# Patient Record
Sex: FEMALE | Race: BLACK OR AFRICAN AMERICAN | URBAN - METROPOLITAN AREA
[De-identification: names, ages, dates, MRNs, and addresses within clinical notes are randomized per-mention and may not be internally consistent; named-entity substitution may affect disease eponyms.]

---

## 2017-06-12 ENCOUNTER — HOSPITAL ENCOUNTER (EMERGENCY)
Facility: CLINIC | Age: 22
Discharge: HOME | End: 2017-06-12
Attending: FAMILY MEDICINE | Admitting: FAMILY MEDICINE

## 2017-06-12 ENCOUNTER — HOSPITAL ENCOUNTER (OUTPATIENT)
Dept: GENERAL RADIOLOGY | Facility: CLINIC | Age: 22
Discharge: HOME | End: 2017-06-12
Attending: FAMILY MEDICINE | Admitting: FAMILY MEDICINE

## 2017-06-12 DIAGNOSIS — S89.92XA LEFT KNEE INJURY, INITIAL ENCOUNTER: ICD-10-CM
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
## 2017-06-12 DIAGNOSIS — S89.92XA LOWER EXTREMITY INJURY, LEFT, INITIAL ENCOUNTER: ICD-10-CM

## 2017-06-12 PROCEDURE — 99283 EMERGENCY DEPT VISIT LOW MDM: CPT

## 2017-06-12 PROCEDURE — 99282 EMERGENCY DEPT VISIT SF MDM: CPT

## 2017-06-12 PROCEDURE — 99284 EMERGENCY DEPT VISIT MOD MDM: CPT | Mod: Z6 | Performed by: FAMILY MEDICINE

## 2017-06-12 NOTE — ED PROVIDER NOTES
History   No chief complaint on file.    HPI  Brenda Mcclain is a 21 year old female who presents with left knee injury. Patient was playing football yesterday when she got tackled to the ground. It felt as if her left leg hyperextended with this. She got up and suddenly her left knee buckled and she fell. She has had left knee pain and swelling with this. She has had difficulty ambulating and bearing weight due to the pain. She denies hip pain or ankle pain. No previous history of knee problems. She denies history of coagulopathy and is not on any blood thinners. She declines any pain medications.     No past medical history on file.    No past surgical history on file.    No family history on file.    Social History   Substance Use Topics     Smoking status: Not on file     Smokeless tobacco: Not on file     Alcohol use Not on file       No current facility-administered medications for this encounter.      No current outpatient prescriptions on file.      Allergies not on file  I have reviewed the Medications, Allergies, Past Medical and Surgical History, and Social History in the Epic system.    Review of Systems   Constitutional: Positive for activity change (difficulty weightbearing because of left knee pain). Negative for appetite change and fever.   HENT: Negative for congestion.    Eyes: Negative for redness.   Respiratory: Negative for shortness of breath.    Cardiovascular: Negative for chest pain.   Gastrointestinal: Negative for abdominal pain, nausea and vomiting.   Genitourinary: Negative for difficulty urinating.   Musculoskeletal: Positive for arthralgias, gait problem and joint swelling (left knee). Negative for back pain, neck pain and neck stiffness.   Skin: Negative for color change.   Neurological: Negative for weakness, numbness and headaches.   Hematological: Does not bruise/bleed easily.   Psychiatric/Behavioral: Positive for decreased concentration and dysphoric mood. Negative for  confusion.   All other systems reviewed and are negative.      Physical Exam      Physical Exam   Constitutional: She is oriented to person, place, and time. She appears well-developed and well-nourished. She appears distressed.   Patient ER is mildly uncomfortable does not want anything for pain left knee obviously swollen   HENT:   Head: Normocephalic and atraumatic.   Eyes: Conjunctivae and EOM are normal. Pupils are equal, round, and reactive to light. No scleral icterus.   Neck: Normal range of motion. Neck supple.   Cardiovascular: Regular rhythm.    Pulmonary/Chest: No respiratory distress.   Abdominal: There is no guarding.   Musculoskeletal: She exhibits edema, tenderness and deformity.   Swelling of left knee but range of motion noted to have almost full extension but some limited flexion.  Patella is in normal alignment.  Hip and ankle otherwise negative.   Neurological: She is alert and oriented to person, place, and time. She has normal reflexes. No cranial nerve deficit. Coordination normal.   Skin: Skin is warm and dry. No rash noted. She is not diaphoretic. No erythema. No pallor.   Psychiatric:   Flat affect   Nursing note and vitals reviewed.      ED Course     ED Course     Patient evaluated in ER.  Patient not wanting anything for pain.  X-rays done of the left knee reveal no obvious fracture or dislocation.  Discussed the patient recommendations for follow-up also patient placed in a knee immobilizer given crutches was in discharged with orthopedics follow-up in a week discussed with orthopedic resident also.  Patient agrees with plan and discharged did not want any other pain medications.      Procedures             Critical Care time:  none               Labs Ordered and Resulted from Time of ED Arrival Up to the Time of Departure from the ED - No data to display        Results for orders placed or performed during the hospital encounter of 06/12/17   XR Knee Left 3 Views    Narrative    XR  KNEE LT 3 VW 6/12/2017 3:30 PM    COMPARISON: None.    HISTORY: Left knee trauma.      Impression    IMPRESSION: Large left knee effusion. No displaced fractures are seen.  Patella digna is noted, suggesting the possibility of patellar tendon  injury given the trauma history. Joint spaces are preserved.    AKIN CHRISTINE         Assessments & Plan (with Medical Decision Making)  21-year-old female with left knee injury.  Happened yesterday.  Patient with effusion able to straight leg raise.  Isolated injury to the left knee x-rays negative for any acute fracture dislocation.  Patient placed in knee immobilizer will follow-up with orthopedics in one week will need further evaluation patient agrees plan and did not want anything for pain.           I have reviewed the nursing notes.    I have reviewed the findings, diagnosis, plan and need for follow up with the patient.    There are no discharge medications for this patient.      Final diagnoses:   Left knee injury, initial encounter       6/12/2017   [facility], [place], EMERGENCY DEPARTMENT    This note was created at least in part by the use of dragon voice dictation system. Inadvertent typographical errors may still exist.  Eliot Malik MD.         Eliot Malik MD  06/13/17 1527

## 2017-06-13 ASSESSMENT — ENCOUNTER SYMPTOMS
ABDOMINAL PAIN: 0
NECK PAIN: 0
SHORTNESS OF BREATH: 0
ACTIVITY CHANGE: 1
BRUISES/BLEEDS EASILY: 0
JOINT SWELLING: 1
COLOR CHANGE: 0
DECREASED CONCENTRATION: 1
ARTHRALGIAS: 1
BACK PAIN: 0
CONFUSION: 0
NAUSEA: 0
NUMBNESS: 0
HEADACHES: 0
VOMITING: 0
APPETITE CHANGE: 0
EYE REDNESS: 0
DIFFICULTY URINATING: 0
FEVER: 0
DYSPHORIC MOOD: 1
WEAKNESS: 0
NECK STIFFNESS: 0

## 2017-06-14 NOTE — DOWNTIME EVENT NOTE
The EMR was down for 7 hours on 6/14/2017.    ANDREW RUTH was responsible for completing the paper charting during this time period.     The following information was re-entered into the system by Evy Mcneil: Allergies, Home meds and MAR    The following information will remain in the paper chart: Remainder of chart     Evy Mcneil  6/14/2017